# Patient Record
Sex: FEMALE | Race: OTHER | NOT HISPANIC OR LATINO | URBAN - METROPOLITAN AREA
[De-identification: names, ages, dates, MRNs, and addresses within clinical notes are randomized per-mention and may not be internally consistent; named-entity substitution may affect disease eponyms.]

---

## 2018-11-01 ENCOUNTER — EMERGENCY (EMERGENCY)
Facility: HOSPITAL | Age: 33
LOS: 1 days | Discharge: HOME | End: 2018-11-01
Attending: EMERGENCY MEDICINE
Payer: MEDICAID

## 2018-11-01 VITALS
OXYGEN SATURATION: 99 % | TEMPERATURE: 98 F | WEIGHT: 160.06 LBS | HEART RATE: 75 BPM | RESPIRATION RATE: 18 BRPM | DIASTOLIC BLOOD PRESSURE: 90 MMHG | SYSTOLIC BLOOD PRESSURE: 145 MMHG | HEIGHT: 65 IN

## 2018-11-01 VITALS
TEMPERATURE: 99 F | OXYGEN SATURATION: 100 % | HEART RATE: 68 BPM | RESPIRATION RATE: 16 BRPM | SYSTOLIC BLOOD PRESSURE: 136 MMHG | DIASTOLIC BLOOD PRESSURE: 106 MMHG

## 2018-11-01 PROCEDURE — 99283 EMERGENCY DEPT VISIT LOW MDM: CPT

## 2018-11-01 RX ORDER — GABAPENTIN 400 MG/1
1 CAPSULE ORAL
Qty: 14 | Refills: 0 | OUTPATIENT
Start: 2018-11-01 | End: 2018-11-14

## 2018-11-01 RX ORDER — OXYCODONE HYDROCHLORIDE 5 MG/1
5 TABLET ORAL ONCE
Qty: 0 | Refills: 0 | Status: DISCONTINUED | OUTPATIENT
Start: 2018-11-01 | End: 2018-11-01

## 2018-11-01 RX ORDER — GABAPENTIN 400 MG/1
300 CAPSULE ORAL ONCE
Qty: 0 | Refills: 0 | Status: COMPLETED | OUTPATIENT
Start: 2018-11-01 | End: 2018-11-01

## 2018-11-01 RX ADMIN — GABAPENTIN 300 MILLIGRAM(S): 400 CAPSULE ORAL at 16:50

## 2018-11-01 RX ADMIN — OXYCODONE HYDROCHLORIDE 5 MILLIGRAM(S): 5 TABLET ORAL at 16:50

## 2018-11-01 NOTE — ED PROVIDER NOTE - ENMT, MLM
Airway patent, Nasal mucosa clear. Mouth with normal mucosa. Normal dentition. Mild tenderness to left TMJ

## 2018-11-01 NOTE — ED ADULT NURSE NOTE - NSIMPLEMENTINTERV_GEN_ALL_ED
Implemented All Universal Safety Interventions:  [place] to call system. Call bell, personal items and telephone within reach. Instruct patient to call for assistance. Room bathroom lighting operational. Non-slip footwear when patient is off stretcher. Physically safe environment: no spills, clutter or unnecessary equipment. Stretcher in lowest position, wheels locked, appropriate side rails in place.

## 2018-11-01 NOTE — ED ADULT NURSE NOTE - OBJECTIVE STATEMENT
33 year old female presents to the ED complaining of intermittent left sided facial pain x 3 days, as per patient she's had intermittent left sided facial pain over the last year. Pt is A&O x 4, VSS, afebrile, ambulating independently. Pt denies fever, chills, NVD, SOB, or chest pain. On neurological assessment no neural or focal deficits notes. Pt speaking clearly, facial symmetry intact, ambulating with a steady gate in ED.

## 2018-11-01 NOTE — ED PROVIDER NOTE - MEDICAL DECISION MAKING DETAILS
33F presenting with intermittent sharp left sided facial pain x3 days. Clinical suspicion for trigeminal neuralgia. Benign physical exam with no obvious other source. 33F presenting with intermittent sharp left sided facial pain x3 days. Clinical suspicion for trigeminal neuralgia. Benign physical exam with no obvious other source.  MD Allison,Attending: pt seen. agree with above HPI/ROS/PE. sxs highly suggestive of TGN. similar episode months ago. No other HEENT or neuro sxs. Does have TMJ clicks bilateral but timing and severity of sxs make TMJ related pathology less likely to be cause. Has seen dentist and no dental cause found for sxs.  rx oxycodone now -- to  and then Rx for Gabapentin. asked to contact her PCP today for neurology followup.

## 2018-11-01 NOTE — ED PROVIDER NOTE - PROGRESS NOTE DETAILS
Patient to be started on gabapentin for likely trigeminal neuralgia. Patient to be discharged with instructions to see pcp for neurology followup and additional workup including possible MR

## 2018-11-01 NOTE — ED PROVIDER NOTE - OBJECTIVE STATEMENT
33F with no pmh presenting with left sided facial pain, intermittently described as a sharp shooting pain that travels to her forehead, cheeks and jaw x 3 days. States similar symptoms about 2-3 months ago, seen by dentist who stated no dental etiology. States symptoms resolved after 1-2 days. No other hx of similar symptoms. Endorses hx of jaw fracture which did not require surgery. States she feels like her teeth are more sensitive and some cold foods exacerbate symptoms. Has tried tylenol, motrin, codeine with no relief. No fever, chills, cp, sob, n/v/d, dizziness, headache, dysuria

## 2018-11-01 NOTE — ED ADULT NURSE NOTE - CHPI ED NUR SYMPTOMS NEG
no fever/no nausea/no numbness/no loss of consciousness/no vomiting/no change in level of consciousness/no dizziness/no confusion/no weakness/no blurred vision

## 2019-07-17 ENCOUNTER — RESULT REVIEW (OUTPATIENT)
Age: 34
End: 2019-07-17

## 2020-08-22 ENCOUNTER — EMERGENCY (EMERGENCY)
Facility: HOSPITAL | Age: 35
LOS: 1 days | Discharge: HOME | End: 2020-08-22
Attending: STUDENT IN AN ORGANIZED HEALTH CARE EDUCATION/TRAINING PROGRAM
Payer: MEDICAID

## 2020-08-22 VITALS
RESPIRATION RATE: 20 BRPM | TEMPERATURE: 98 F | DIASTOLIC BLOOD PRESSURE: 92 MMHG | WEIGHT: 164.91 LBS | SYSTOLIC BLOOD PRESSURE: 137 MMHG | HEIGHT: 65 IN | OXYGEN SATURATION: 95 % | HEART RATE: 80 BPM

## 2020-08-22 LAB
ALBUMIN SERPL ELPH-MCNC: 4.7 G/DL — SIGNIFICANT CHANGE UP (ref 3.3–5)
ALP SERPL-CCNC: 68 U/L — SIGNIFICANT CHANGE UP (ref 40–120)
ALT FLD-CCNC: 45 U/L — SIGNIFICANT CHANGE UP (ref 10–45)
ANION GAP SERPL CALC-SCNC: 12 MMOL/L — SIGNIFICANT CHANGE UP (ref 5–17)
APAP SERPL-MCNC: <15 UG/ML — SIGNIFICANT CHANGE UP (ref 10–30)
APPEARANCE UR: CLEAR — SIGNIFICANT CHANGE UP
AST SERPL-CCNC: 20 U/L — SIGNIFICANT CHANGE UP (ref 10–40)
BASOPHILS # BLD AUTO: 0.05 K/UL — SIGNIFICANT CHANGE UP (ref 0–0.2)
BASOPHILS NFR BLD AUTO: 0.6 % — SIGNIFICANT CHANGE UP (ref 0–2)
BILIRUB SERPL-MCNC: 0.4 MG/DL — SIGNIFICANT CHANGE UP (ref 0.2–1.2)
BILIRUB UR-MCNC: NEGATIVE — SIGNIFICANT CHANGE UP
BUN SERPL-MCNC: 9 MG/DL — SIGNIFICANT CHANGE UP (ref 7–23)
CALCIUM SERPL-MCNC: 8.9 MG/DL — SIGNIFICANT CHANGE UP (ref 8.4–10.5)
CHLORIDE SERPL-SCNC: 109 MMOL/L — HIGH (ref 96–108)
CO2 SERPL-SCNC: 25 MMOL/L — SIGNIFICANT CHANGE UP (ref 22–31)
COLOR SPEC: COLORLESS — SIGNIFICANT CHANGE UP
CREAT SERPL-MCNC: 0.8 MG/DL — SIGNIFICANT CHANGE UP (ref 0.5–1.3)
DIFF PNL FLD: NEGATIVE — SIGNIFICANT CHANGE UP
EOSINOPHIL # BLD AUTO: 0.04 K/UL — SIGNIFICANT CHANGE UP (ref 0–0.5)
EOSINOPHIL NFR BLD AUTO: 0.5 % — SIGNIFICANT CHANGE UP (ref 0–6)
ETHANOL SERPL-MCNC: 319 MG/DL — HIGH (ref 0–10)
GAS PNL BLDV: SIGNIFICANT CHANGE UP
GLUCOSE SERPL-MCNC: 94 MG/DL — SIGNIFICANT CHANGE UP (ref 70–99)
GLUCOSE UR QL: NEGATIVE — SIGNIFICANT CHANGE UP
HCG SERPL-ACNC: <2 MIU/ML — SIGNIFICANT CHANGE UP
HCT VFR BLD CALC: 45 % — SIGNIFICANT CHANGE UP (ref 34.5–45)
HGB BLD-MCNC: 14.9 G/DL — SIGNIFICANT CHANGE UP (ref 11.5–15.5)
IMM GRANULOCYTES NFR BLD AUTO: 0.5 % — SIGNIFICANT CHANGE UP (ref 0–1.5)
KETONES UR-MCNC: NEGATIVE — SIGNIFICANT CHANGE UP
LEUKOCYTE ESTERASE UR-ACNC: NEGATIVE — SIGNIFICANT CHANGE UP
LYMPHOCYTES # BLD AUTO: 1.95 K/UL — SIGNIFICANT CHANGE UP (ref 1–3.3)
LYMPHOCYTES # BLD AUTO: 25 % — SIGNIFICANT CHANGE UP (ref 13–44)
MAGNESIUM SERPL-MCNC: 2.5 MG/DL — SIGNIFICANT CHANGE UP (ref 1.6–2.6)
MCHC RBC-ENTMCNC: 30.6 PG — SIGNIFICANT CHANGE UP (ref 27–34)
MCHC RBC-ENTMCNC: 33.1 GM/DL — SIGNIFICANT CHANGE UP (ref 32–36)
MCV RBC AUTO: 92.4 FL — SIGNIFICANT CHANGE UP (ref 80–100)
MONOCYTES # BLD AUTO: 0.67 K/UL — SIGNIFICANT CHANGE UP (ref 0–0.9)
MONOCYTES NFR BLD AUTO: 8.6 % — SIGNIFICANT CHANGE UP (ref 2–14)
NEUTROPHILS # BLD AUTO: 5.05 K/UL — SIGNIFICANT CHANGE UP (ref 1.8–7.4)
NEUTROPHILS NFR BLD AUTO: 64.8 % — SIGNIFICANT CHANGE UP (ref 43–77)
NITRITE UR-MCNC: NEGATIVE — SIGNIFICANT CHANGE UP
NRBC # BLD: 0 /100 WBCS — SIGNIFICANT CHANGE UP (ref 0–0)
PCP SPEC-MCNC: SIGNIFICANT CHANGE UP
PH UR: 6.5 — SIGNIFICANT CHANGE UP (ref 5–8)
PHOSPHATE SERPL-MCNC: 4.1 MG/DL — SIGNIFICANT CHANGE UP (ref 2.5–4.5)
PLATELET # BLD AUTO: 292 K/UL — SIGNIFICANT CHANGE UP (ref 150–400)
POTASSIUM SERPL-MCNC: 3.4 MMOL/L — LOW (ref 3.5–5.3)
POTASSIUM SERPL-SCNC: 3.4 MMOL/L — LOW (ref 3.5–5.3)
PROT SERPL-MCNC: 7.1 G/DL — SIGNIFICANT CHANGE UP (ref 6–8.3)
PROT UR-MCNC: NEGATIVE — SIGNIFICANT CHANGE UP
RBC # BLD: 4.87 M/UL — SIGNIFICANT CHANGE UP (ref 3.8–5.2)
RBC # FLD: 12.5 % — SIGNIFICANT CHANGE UP (ref 10.3–14.5)
SALICYLATES SERPL-MCNC: <2 MG/DL — LOW (ref 15–30)
SARS-COV-2 RNA SPEC QL NAA+PROBE: SIGNIFICANT CHANGE UP
SODIUM SERPL-SCNC: 146 MMOL/L — HIGH (ref 135–145)
SP GR SPEC: 1 — LOW (ref 1.01–1.02)
UROBILINOGEN FLD QL: NEGATIVE — SIGNIFICANT CHANGE UP
WBC # BLD: 7.8 K/UL — SIGNIFICANT CHANGE UP (ref 3.8–10.5)
WBC # FLD AUTO: 7.8 K/UL — SIGNIFICANT CHANGE UP (ref 3.8–10.5)

## 2020-08-22 PROCEDURE — 99285 EMERGENCY DEPT VISIT HI MDM: CPT

## 2020-08-22 PROCEDURE — 93010 ELECTROCARDIOGRAM REPORT: CPT

## 2020-08-22 RX ORDER — SODIUM CHLORIDE 9 MG/ML
1000 INJECTION, SOLUTION INTRAVENOUS ONCE
Refills: 0 | Status: COMPLETED | OUTPATIENT
Start: 2020-08-22 | End: 2020-08-22

## 2020-08-22 RX ADMIN — SODIUM CHLORIDE 1000 MILLILITER(S): 9 INJECTION, SOLUTION INTRAVENOUS at 22:50

## 2020-08-22 NOTE — ED PROVIDER NOTE - PHYSICAL EXAMINATION
GENl: Patient awake alert NAD.   HEENT: normocephalic, atraumatic, EOMI, no scleral icterus.   CARDIAC: RRR, S1, S2, no murmur.   PULM: CTA B/L no wheeze, rhonchi, rales.   ABD: soft NT, ND, no rebound no guarding, no CVA tenderness.   MSK: no edema. 5/5 strength and full ROM in all extremities.     NEURO: A&Ox3, no focal neurological deficits, CN 2-12 grossly intact  SKIN: warm, dry, no rash.  Psych: + guarding.

## 2020-08-22 NOTE — ED ADULT NURSE NOTE - NSIMPLEMENTINTERV_GEN_ALL_ED
Implemented All Fall Risk Interventions:  [place] to call system. Call bell, personal items and telephone within reach. Instruct patient to call for assistance. Room bathroom lighting operational. Non-slip footwear when patient is off stretcher. Physically safe environment: no spills, clutter or unnecessary equipment. Stretcher in lowest position, wheels locked, appropriate side rails in place. Provide visual cue, wrist band, yellow gown, etc. Monitor gait and stability. Monitor for mental status changes and reorient to person, place, and time. Review medications for side effects contributing to fall risk. Reinforce activity limits and safety measures with patient and family.

## 2020-08-22 NOTE — ED PROVIDER NOTE - OBJECTIVE STATEMENT
34 yo F pm hx of depression, Rx Mirtazepine by pmd 1 week ago, brought in by  for SI attempt. Pt states she is depressed because she lost her job in Feb and has been trying to divorce her . In the ED, pt is well appearing AOx3 but guarding and     History below obtained form . 36 yo F pm hx of depression, Rx Mirtazepine by pmd 1 week ago, brought in by  for SI attempt. Pt states she is depressed because she lost her job in Feb and has been trying to divorce her . In the ED, pt is well appearing AOx3 but minimizing and guarding, states she took 1 tab of her antidepressant, denies ETOH, and that her  brought her to ed because he was worried she wasn't eating for 3 days. She states she would "rather poison herself" than go home. Lives at home with  and son, denies SI,HI,AVH. History below obtained form . States she drinks daily, and today texted him that she took "a lot" of antidepressants. he was concerned so brought her to ED.

## 2020-08-22 NOTE — ED ADULT NURSE NOTE - OBJECTIVE STATEMENT
35yF brought into ED from home by  for concern of possible overdose. PMH of depression and HTN. Pt endorses taking medication for HTN and depression per . Per , pt was drinking alcohol tonight and sent a text to her  that she took multiple pills tonight. When he arrived home, he states that he noticed she seemed drunk and pt told her  she took 5 pills tonight. Pt also states that she has had decreased appetite and has not eaten in the last three days. Pt also reports she has been having intermittent headaches but denies any pain at this time. Upon arrival to the ED, pt is AAOx4. Pt is playful and making jokes when asked questions. Pt well appearing and well groomed. Upon initial questioning pt denies SI/HI, alcohol use or drug use. However, during further conversation pt stated "I would rather poison myself than do that" when asked it she would to go home then pt laughed and states "I was kidding". Pt reports she has been feeling depressed over the past few months which has been getting worse. Pt also states that over the past few months she has been trying to divorce her  and three days ago when she stopped eating she reports that they had a conversation about the divorce which upset her. Pt denies any pain or discomfort at this time. Pt denies CP, SOB, N/V, abdominal pain, back pain, headaches, dizziness. Pt placed on constant observation for patient safety and staff member at bedside within arms reach. Safety maintained and bed locked in lowest position with appropriate side rails raised. ED MD at bedside to assess pt.

## 2020-08-22 NOTE — ED PROVIDER NOTE - NSFOLLOWUPCLINICS_GEN_ALL_ED_FT
[facility] Dept of Substance Abuse  Psychiatry Substance Abuse  [address] [place] [address]  Phone: [phone]  Fax:   Follow Up Time: Urgent    [facility]  Psychiatry  [address] [place] [address]  Phone: [phone]  Fax:   Follow Up Time: Urgent

## 2020-08-22 NOTE — ED PROVIDER NOTE - CLINICAL SUMMARY MEDICAL DECISION MAKING FREE TEXT BOX
36 yo F pm hx of depression, Rx Mirtazepine by pmd 1 week ago, brought in by  for SI attempt. Pt is minimizing situation. Psych clearance labs, U preg, serum mag phos, psych consult, and SW in the AM.

## 2020-08-22 NOTE — ED PROVIDER NOTE - PROGRESS NOTE DETAILS
signed out by Dr Garza, with reports of suicide attempt and intoxication with alcohol. also questionable safety issues as she reportedly said she rather poison herself than going back home. will reasses in AM for sobering , SW and psych then. DJ Madonna Jimenez M.D. Resident  telepsych will see patient. endorsed to me - dw psychiatry who clears pt - pt claims to not have si and does not recall the texts sent while intoxicated. no si now. roshan ramsey f/u -Andrew Brown MD-

## 2020-08-22 NOTE — ED PROVIDER NOTE - ATTENDING CONTRIBUTION TO CARE
Attending MD Garza:   I personally have seen and examined this patient.  ACP, Resident, medical student note reviewed and agree on plan of care and except where noted.    35y F PMH HTN, depression brought in by  with concern for possible overdose. 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 Patient told nurse she has wanted a divorce for a long time. Lives at home with  and minor son. Denies SI/HI, history of psychiatric illness, drug or alcohol use. Per  (Keeley Tran), patient has always "liked drinking alcohol," was often a source of fights, she has been drinking more since losing job, "anything she can find" including wine, champagne, vodka. He states she has ever had a drug use disorder that he knows of. Tonight patient texted him that she took a lot of pills. When he arrived home, she seemed drunk, said she only took 5 antidepressants, type unknown. Attending MD Garza:   I personally have seen and examined this patient.  ACP, Resident, medical student note reviewed and agree on plan of care and except where noted.    35y F PMH HTN, depression brought in by  with concern for possible overdose. 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 Patient told nurse she has wanted a divorce for a long time. Lives at home with  and minor son. Denies SI/HI, history of psychiatric illness, drug or alcohol use. Per  (Keeley Tran), patient has always "liked drinking alcohol," was often a source of fights, she has been drinking more since losing job, "anything she can find" including wine, champagne, vodka. He states she has ever had a drug use disorder that he knows of. Tonight patient texted him that she took a lot of pills. When he arrived home, she seemed drunk, said she only took 5 antidepressants, type unknown. PMD Eloy Beatty.    on exam patient is well appearing, vitals wnl, rrr s1s2, lungs clear, abdomen soft ntnd, fluid speech, A+O x 3, giddy, making jokes.     Concern for intentional ingestion/possible overdose, SI. Will obtain labs, ekg, place on 1:1, psych consult when sober, SW in AM.

## 2020-08-22 NOTE — ED PROVIDER NOTE - NSFOLLOWUPINSTRUCTIONS_ED_ALL_ED_FT
IMPORTANT INSTRUCTIONS FROM Dr. LARSON:    Please follow up with your personal medical doctor in 24-48 hours.   Bring results from today to your visit.    Follow up with psychiatry as discussed.     If you were advised to take any medications - be sure to review the package insert.    If your symptoms change, get worse or if you have any new symptoms, come to the ER right away.  If you have any questions, call the ER at the phone number on this page.

## 2020-08-22 NOTE — ED PROVIDER NOTE - NS ED ROS FT
GENERAL: No fever, chills  EYES: no vision changes, no discharge.   ENT: no difficulty swallowing or speaking   CARDIAC: no chest pain/pressure, SOB, lower extremity swelling  PULMONARY: no cough, SOB  GI: no abdominal pain, n/v/d  : no dysuria, no hematuria  SKIN: no rashes, no ecchymosis  NEURO: no headache, lightheadedness  MSK: No joint pain, myalgia, weakness.  Psych: + depressed.

## 2020-08-23 VITALS — SYSTOLIC BLOOD PRESSURE: 136 MMHG | HEART RATE: 80 BPM | DIASTOLIC BLOOD PRESSURE: 87 MMHG | TEMPERATURE: 98 F

## 2020-08-23 DIAGNOSIS — F10.10 ALCOHOL ABUSE, UNCOMPLICATED: ICD-10-CM

## 2020-08-23 DIAGNOSIS — F32.1 MAJOR DEPRESSIVE DISORDER, SINGLE EPISODE, MODERATE: ICD-10-CM

## 2020-08-23 LAB
SARS-COV-2 IGG SERPL QL IA: NEGATIVE — SIGNIFICANT CHANGE UP
SARS-COV-2 IGM SERPL IA-ACNC: 0.08 INDEX — SIGNIFICANT CHANGE UP
TSH SERPL-MCNC: 0.69 UIU/ML — SIGNIFICANT CHANGE UP (ref 0.27–4.2)

## 2020-08-23 PROCEDURE — 80307 DRUG TEST PRSMV CHEM ANLYZR: CPT

## 2020-08-23 PROCEDURE — 81003 URINALYSIS AUTO W/O SCOPE: CPT

## 2020-08-23 PROCEDURE — 82803 BLOOD GASES ANY COMBINATION: CPT

## 2020-08-23 PROCEDURE — 84100 ASSAY OF PHOSPHORUS: CPT

## 2020-08-23 PROCEDURE — 84295 ASSAY OF SERUM SODIUM: CPT

## 2020-08-23 PROCEDURE — 85014 HEMATOCRIT: CPT

## 2020-08-23 PROCEDURE — 86769 SARS-COV-2 COVID-19 ANTIBODY: CPT

## 2020-08-23 PROCEDURE — 84702 CHORIONIC GONADOTROPIN TEST: CPT

## 2020-08-23 PROCEDURE — 83735 ASSAY OF MAGNESIUM: CPT

## 2020-08-23 PROCEDURE — 84443 ASSAY THYROID STIM HORMONE: CPT

## 2020-08-23 PROCEDURE — 93005 ELECTROCARDIOGRAM TRACING: CPT

## 2020-08-23 PROCEDURE — 83605 ASSAY OF LACTIC ACID: CPT

## 2020-08-23 PROCEDURE — 84132 ASSAY OF SERUM POTASSIUM: CPT

## 2020-08-23 PROCEDURE — 80053 COMPREHEN METABOLIC PANEL: CPT

## 2020-08-23 PROCEDURE — 82330 ASSAY OF CALCIUM: CPT

## 2020-08-23 PROCEDURE — 82947 ASSAY GLUCOSE BLOOD QUANT: CPT

## 2020-08-23 PROCEDURE — 99285 EMERGENCY DEPT VISIT HI MDM: CPT

## 2020-08-23 PROCEDURE — 82435 ASSAY OF BLOOD CHLORIDE: CPT

## 2020-08-23 PROCEDURE — 85027 COMPLETE CBC AUTOMATED: CPT

## 2020-08-23 RX ORDER — ACETAMINOPHEN 500 MG
975 TABLET ORAL ONCE
Refills: 0 | Status: COMPLETED | OUTPATIENT
Start: 2020-08-23 | End: 2020-08-23

## 2020-08-23 RX ADMIN — Medication 975 MILLIGRAM(S): at 09:55

## 2020-08-23 NOTE — ED BEHAVIORAL HEALTH ASSESSMENT NOTE - SUICIDE PROTECTIVE FACTORS
Supportive social network of family or friends/Fear of death or the actual act of killing self/Engaged in work or school/Identifies reasons for living/Has future plans/Responsibility to family and others

## 2020-08-23 NOTE — ED BEHAVIORAL HEALTH ASSESSMENT NOTE - SUMMARY
The patient is a 35yr old  woman with a PMH HTN and depression brought in by her  last night after she sent him a text that "she had taken a lot on antidepressants," with concern for possible overdose.  She admits to drinking a bottle of wine after a two day fast (for weight loss) and says she blacked out and does not clearly recall what she did but thinks she may have taken five tablets of Remeron 15mg which she was prescribed for insomnia and depression. She denies having any history of suicidality in the past.  Pt's  reported that when he arrived home, after she sent him the text message, pt seemed intoxicated and said she only took 5 tablets of Remeron 15mg.   Pt now appears calm and able to speak clearly with no thoughts of harming herself or others.  She denies ever having thoughts of suicide and says she does not recall what she did when she was intoxicated yesterday evening, though she agrees that this was serious and says she would like outpatient therapy.    She says that this is the first time she had a "black out" after drinking and says she generally drinks only two glasses or half a bottle of wine on some evenings, not every day.  She denies ever having any alcohol withdrawal symptoms.  She admits she has been reluctant to seek treatment for her depression and alcohol use, because she thought she should be able to handle this herself.  She understands that she would benefit from outpatient therapy and AA groups and agrees to this. Her  is aware of the discharge plans since he arrived and is at her bedside. The patient is a 35yr old  woman with a PMH HTN and depression brought in by her  last night after she sent him a text that "she had taken a lot of antidepressants," with concern for possible overdose.  She admits to drinking a bottle of wine after a two day fast (for weight loss) and says she blacked out and does not clearly recall what she did but thinks she may have taken five tablets of Remeron 15mg which she was prescribed for insomnia and depression. She denies having any history of suicidality in the past.  Pt's  reported that when he arrived home, after she sent him the text message, pt seemed intoxicated and said she only took 5 tablets of Remeron 15mg.   Pt now appears calm and able to speak clearly with no thoughts of harming herself or others.  She denies ever having thoughts of suicide and says she does not recall what she did when she was intoxicated yesterday evening, though she agrees that this was serious and says she would like outpatient therapy.    She says that this is the first time she had a "black out" after drinking and says she generally drinks only two glasses or half a bottle of wine on some evenings, not every day.  She denies ever having any alcohol withdrawal symptoms.  She admits she has been reluctant to seek treatment for her depression and alcohol use, because she thought she should be able to handle this herself.  She understands that she would benefit from outpatient therapy and AA groups and agrees to this. Her  is aware of the discharge plans since he arrived and is at her bedside.

## 2020-08-23 NOTE — ED BEHAVIORAL HEALTH ASSESSMENT NOTE - REFERRAL / APPOINTMENT DETAILS
referral for the [facility] clinic at [facility] [phone] and the walk in clinic [phone]

## 2020-08-23 NOTE — ED BEHAVIORAL HEALTH ASSESSMENT NOTE - HPI (INCLUDE ILLNESS QUALITY, SEVERITY, DURATION, TIMING, CONTEXT, MODIFYING FACTORS, ASSOCIATED SIGNS AND SYMPTOMS)
The patient is a 35yr old  woman with a PMH HTN and depression brought in by her  last night after she sent him a text that "she had taken a lot on antidepressants," with concern for possible overdose. Patient reports that they are in the process of  and she just signed a lease for an apartment of [facility] where she hopes to move with her 6 yr old son next weekend. She says that yesterday she spent the day with her son at the pool in [place] and her  had taken their son to his sister's house in the evening for dinner.  She admits to drinking a bottle of wine after a two day fast (for weight loss) and says she blacked out and does not clearly recall what she did but thinks she may have taken five tablets of Remeron 15mg which she was prescribed for insomnia and depression. She denies having any history of suicidality in the past.  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 Patient has wanted a divorce for a few years and is now in the process of separation. Pt's  (Keeley Tran) reported that patient has always "liked drinking alcohol," and this was often a source of fights. She has been drinking more since losing job, "anything she can find" including wine, champagne, vodka. After patient sent his a text that she took a lot of pills. When he arrived home, she seemed intoxicated and said she only took 5 tablets of Remeron 15mg.   Pt now appears calm and able to speak clearly with no thoughts of harming herself or others.  She denies ever having thoughts of suicide and says she does not recall what she did when she was intoxicated yesterday evening, though she agrees that this was serious and says she would like outpt therapy.    She says that this is the first time she had a "black out" after drinking and says she generally drinks only two glasses or half a bottle of wine.  She denies ever having any alcohol withdrawal symptoms.  She admits she has been reluctant to seek treatment for her depression and alcohol use, because she thought she should be able to handle this herself.  She understands that she would benefit from outpatient therapy and AA groups and agrees this. The patient is a 35yr old  woman with a PMH HTN and depression brought in by her  last night after she sent him a text that "she had taken a lot on antidepressants," with concern for possible overdose. Patient reports that they are in the process of  and she just signed a lease for an apartment on [facility] where she hopes to move with her 6 yr old son next weekend. She says that yesterday she spent the day with her son at the pool in [place] and her  had taken their son to his sister's house in the evening for dinner.  She admits to drinking a bottle of wine after a two day fast (for weight loss) and says she blacked out and does not clearly recall what she did but thinks she may have taken five tablets of Remeron 15mg which she was prescribed for insomnia and depression. She denies having any history of suicidality in the past.  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 Patient has wanted a divorce for a few years and is now in the process of separation. Pt's  (Keeley Tran) reported that patient has always "liked drinking alcohol," and this was often a source of fights. She has been drinking more since losing her job, "anything she can find" including wine, champagne, vodka. Yesterday evening patient sent him a text that she took "a lot of pills". When he arrived home, she seemed intoxicated and said she only took 5 tablets of Remeron 15mg.   Pt now appears calm and able to speak clearly with no thoughts of harming herself or others.  She denies ever having thoughts of suicide and says she does not recall what she did when she was intoxicated yesterday evening, though she agrees that this was serious and says she would like outpatient therapy.    She says that this is the first time she had a "black out" after drinking and says she generally drinks only two glasses or half a bottle of wine on some evening, not every day.  She denies ever having any alcohol withdrawal symptoms.  She admits she has been reluctant to seek treatment for her depression and alcohol use, because she thought she should be able to handle this herself.  She understands that she would benefit from outpatient therapy and AA groups and agrees to this. Her  is aware of the discharge plans since he arrived and is at her bedside. The patient is a 35yr old  woman with a PMH HTN and depression brought in by her  last night after she sent him a text that "she had taken a lot on antidepressants," with concern for possible overdose. Patient reports that they are in the process of  and she just signed a lease for an apartment on [facility] where she hopes to move with her 6 yr old son next weekend. She says that yesterday she spent the day with her son at the pool in [place] and her  had taken their son to his sister's house in the evening for dinner.  She admits to drinking a bottle of wine after a two day fast (for weight loss) and says she blacked out and does not clearly recall what she did but thinks she may have taken five tablets of Remeron 15mg which she was prescribed for insomnia and depression. She denies having any history of suicidality in the past.  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 Patient has wanted a divorce for a few years and is now in the process of separation. Pt's  (Keeley Tran) reported that patient has always "liked drinking alcohol," and this was often a source of fights. She has been drinking more since losing her job, "anything she can find" including wine, champagne, vodka. Yesterday evening patient sent him a text that she took "a lot of pills". When he arrived home, she seemed intoxicated and said she only took 5 tablets of Remeron 15mg.   Pt now appears calm and able to speak clearly with no thoughts of harming herself or others.  She denies ever having thoughts of suicide and says she does not recall what she did when she was intoxicated yesterday evening, though she agrees that this was serious and says she would like outpatient therapy.    She says that this is the first time she had a "black out" after drinking and says she generally drinks only two glasses or half a bottle of wine on some evenings, not every day.  She denies ever having any alcohol withdrawal symptoms.  She admits she has been reluctant to seek treatment for her depression and alcohol use, because she thought she should be able to handle this herself.  She understands that she would benefit from outpatient therapy and AA groups and agrees to this. Her  is aware of the discharge plans since he arrived. The patient is a 35yr old  woman with a PMH HTN and depression brought in by her  last night after she sent him a text that "she had taken a lot of antidepressants," with concern for possible overdose. Patient reports that they are in the process of  and she just signed a lease for an apartment on [facility] where she hopes to move with her 6 yr old son next weekend. She says that yesterday she spent the day with her son at the pool in [place] and her  had taken their son to his sister's house in the evening for dinner.  She admits to drinking a bottle of wine after a two day fast (for weight loss) and says she blacked out and does not clearly recall what she did but thinks she may have taken five tablets of Remeron 15mg which she was prescribed for insomnia and depression. She denies having any history of suicidality in the past.  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 Patient has wanted a divorce for a few years and is now in the process of separation. Pt's  (Keeley Tran) reported that patient has always "liked drinking alcohol," and this was often a source of fights. She has been drinking more since losing her job, "anything she can find" including wine, champagne, vodka. Yesterday evening patient sent him a text that she took "a lot of pills". When he arrived home, she seemed intoxicated and said she only took 5 tablets of Remeron 15mg.   Pt now appears calm and able to speak clearly with no thoughts of harming herself or others.  She denies ever having thoughts of suicide and says she does not recall what she did when she was intoxicated yesterday evening, though she agrees that this was serious and says she would like outpatient therapy.    She says that this is the first time she had a "black out" after drinking and says she generally drinks only two glasses or half a bottle of wine on some evenings, not every day.  She denies ever having any alcohol withdrawal symptoms.  She admits she has been reluctant to seek treatment for her depression and alcohol use, because she thought she should be able to handle this herself.  She understands that she would benefit from outpatient therapy and AA groups and agrees to this. Her  is aware of the discharge plans since he arrived.

## 2020-08-23 NOTE — ED BEHAVIORAL HEALTH ASSESSMENT NOTE - SAFETY PLAN DETAILS
Pt agrees to call the clinic or return to the ED if experiencing any SI or alcohol withdrawal symptoms.

## 2020-08-23 NOTE — ED BEHAVIORAL HEALTH ASSESSMENT NOTE - DESCRIPTION
Pt has been calm and cooperative.  She received Librium 50mg earlier today which she says made her a little drowsy, but she is alert now with no symptoms of alcohol withdrawal and no thoughts of harming herself or others. 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 She is originally from [place] and fluent in English.

## 2020-08-23 NOTE — ED BEHAVIORAL HEALTH ASSESSMENT NOTE - RISK ASSESSMENT
Pt is not an acute risk for self harm though she remains at chronic risk because of her alcohol abuse history. Low Acute Suicide Risk

## 2020-08-23 NOTE — ED ADULT NURSE REASSESSMENT NOTE - NS ED NURSE REASSESS COMMENT FT1
Assumed pt care from previous RN.  Bedside report with Zachary DAUGHERTY RN.  Pt alert and oriented, stretcher side rails up, and in lowest locked position.  Safety maintained. Pt updated on POC. PT remains on 1:1.  Pt understands POC and need for 1:1.  Pt is calm and compliant.  Awaiting psych eval.

## 2020-08-23 NOTE — ED BEHAVIORAL HEALTH NOTE - BEHAVIORAL HEALTH NOTE
Consult requested by EM Resident Dr. Jimenez at 6:44. Telepsychiatry attempted to consult at 6:49 but unable to initiate due to patient not being set up. ED staff aware.

## 2020-12-24 PROBLEM — F41.9 ANXIETY DISORDER, UNSPECIFIED: Chronic | Status: ACTIVE | Noted: 2020-08-23

## 2020-12-24 PROBLEM — F10.10 ALCOHOL ABUSE, UNCOMPLICATED: Chronic | Status: ACTIVE | Noted: 2020-08-23

## 2020-12-28 ENCOUNTER — APPOINTMENT (OUTPATIENT)
Dept: ULTRASOUND IMAGING | Facility: CLINIC | Age: 35
End: 2020-12-28
Payer: MEDICAID

## 2020-12-28 ENCOUNTER — OUTPATIENT (OUTPATIENT)
Dept: OUTPATIENT SERVICES | Facility: HOSPITAL | Age: 35
LOS: 1 days | End: 2020-12-28
Payer: MEDICAID

## 2020-12-28 DIAGNOSIS — R10.2 PELVIC AND PERINEAL PAIN: ICD-10-CM

## 2020-12-28 PROCEDURE — 76830 TRANSVAGINAL US NON-OB: CPT | Mod: 26

## 2020-12-28 PROCEDURE — 76830 TRANSVAGINAL US NON-OB: CPT

## 2020-12-28 PROCEDURE — 76856 US EXAM PELVIC COMPLETE: CPT

## 2020-12-28 PROCEDURE — 76856 US EXAM PELVIC COMPLETE: CPT | Mod: 26,59

## 2021-02-19 ENCOUNTER — APPOINTMENT (OUTPATIENT)
Dept: CT IMAGING | Facility: CLINIC | Age: 36
End: 2021-02-19
Payer: MEDICAID

## 2021-02-19 ENCOUNTER — OUTPATIENT (OUTPATIENT)
Dept: OUTPATIENT SERVICES | Facility: HOSPITAL | Age: 36
LOS: 1 days | End: 2021-02-19
Payer: MEDICAID

## 2021-02-19 DIAGNOSIS — J34.2 DEVIATED NASAL SEPTUM: ICD-10-CM

## 2021-02-19 PROCEDURE — 70450 CT HEAD/BRAIN W/O DYE: CPT

## 2021-02-19 PROCEDURE — 70450 CT HEAD/BRAIN W/O DYE: CPT | Mod: 26

## 2021-03-24 NOTE — ED ADULT NURSE NOTE - PLAN OF CARE
Patient is scheduled to have her teeth pulled in about 2 weeks. She was asked if she has a heart mummer and she stated she did when she was 7 years old. Patient is asking if that is something in her chart that is noted?   Patient stated they are requesting because she may need an antibiotic prescription before dental procedure.     Please Advise    Explanation of exam/test/Position of comfort/Call bell

## 2021-07-30 ENCOUNTER — TRANSCRIPTION ENCOUNTER (OUTPATIENT)
Age: 36
End: 2021-07-30

## 2021-09-05 ENCOUNTER — EMERGENCY (EMERGENCY)
Facility: HOSPITAL | Age: 36
LOS: 1 days | Discharge: HOME | End: 2021-09-05
Attending: STUDENT IN AN ORGANIZED HEALTH CARE EDUCATION/TRAINING PROGRAM | Admitting: STUDENT IN AN ORGANIZED HEALTH CARE EDUCATION/TRAINING PROGRAM
Payer: MEDICAID

## 2021-09-05 VITALS
DIASTOLIC BLOOD PRESSURE: 77 MMHG | TEMPERATURE: 98 F | HEART RATE: 94 BPM | OXYGEN SATURATION: 100 % | SYSTOLIC BLOOD PRESSURE: 121 MMHG | RESPIRATION RATE: 18 BRPM

## 2021-09-05 LAB
ALBUMIN SERPL ELPH-MCNC: 4.7 G/DL — SIGNIFICANT CHANGE UP (ref 3.3–5)
ALP SERPL-CCNC: 73 U/L — SIGNIFICANT CHANGE UP (ref 40–120)
ALT FLD-CCNC: 12 U/L — SIGNIFICANT CHANGE UP (ref 4–33)
AMPHET UR-MCNC: NEGATIVE — SIGNIFICANT CHANGE UP
ANION GAP SERPL CALC-SCNC: 15 MMOL/L — HIGH (ref 7–14)
APAP SERPL-MCNC: <15 UG/ML — SIGNIFICANT CHANGE UP (ref 15–25)
AST SERPL-CCNC: 8 U/L — SIGNIFICANT CHANGE UP (ref 4–32)
BARBITURATES UR SCN-MCNC: NEGATIVE — SIGNIFICANT CHANGE UP
BASOPHILS # BLD AUTO: 0.05 K/UL — SIGNIFICANT CHANGE UP (ref 0–0.2)
BASOPHILS NFR BLD AUTO: 0.6 % — SIGNIFICANT CHANGE UP (ref 0–2)
BENZODIAZ UR-MCNC: NEGATIVE — SIGNIFICANT CHANGE UP
BILIRUB SERPL-MCNC: <0.2 MG/DL — SIGNIFICANT CHANGE UP (ref 0.2–1.2)
BUN SERPL-MCNC: 13 MG/DL — SIGNIFICANT CHANGE UP (ref 7–23)
CALCIUM SERPL-MCNC: 8.4 MG/DL — SIGNIFICANT CHANGE UP (ref 8.4–10.5)
CHLORIDE SERPL-SCNC: 111 MMOL/L — HIGH (ref 98–107)
CO2 SERPL-SCNC: 22 MMOL/L — SIGNIFICANT CHANGE UP (ref 22–31)
COCAINE METAB.OTHER UR-MCNC: NEGATIVE — SIGNIFICANT CHANGE UP
CREAT SERPL-MCNC: 0.69 MG/DL — SIGNIFICANT CHANGE UP (ref 0.5–1.3)
CREATININE URINE RESULT, DAU: 67 MG/DL — SIGNIFICANT CHANGE UP
EOSINOPHIL # BLD AUTO: 0.1 K/UL — SIGNIFICANT CHANGE UP (ref 0–0.5)
EOSINOPHIL NFR BLD AUTO: 1.2 % — SIGNIFICANT CHANGE UP (ref 0–6)
ETHANOL SERPL-MCNC: 308 MG/DL — HIGH
GLUCOSE SERPL-MCNC: 78 MG/DL — SIGNIFICANT CHANGE UP (ref 70–99)
HCT VFR BLD CALC: 44.6 % — SIGNIFICANT CHANGE UP (ref 34.5–45)
HGB BLD-MCNC: 14.6 G/DL — SIGNIFICANT CHANGE UP (ref 11.5–15.5)
IANC: 4.97 K/UL — SIGNIFICANT CHANGE UP (ref 1.5–8.5)
IMM GRANULOCYTES NFR BLD AUTO: 0.7 % — SIGNIFICANT CHANGE UP (ref 0–1.5)
LYMPHOCYTES # BLD AUTO: 2.49 K/UL — SIGNIFICANT CHANGE UP (ref 1–3.3)
LYMPHOCYTES # BLD AUTO: 30.3 % — SIGNIFICANT CHANGE UP (ref 13–44)
MCHC RBC-ENTMCNC: 30.4 PG — SIGNIFICANT CHANGE UP (ref 27–34)
MCHC RBC-ENTMCNC: 32.7 GM/DL — SIGNIFICANT CHANGE UP (ref 32–36)
MCV RBC AUTO: 92.9 FL — SIGNIFICANT CHANGE UP (ref 80–100)
METHADONE UR-MCNC: NEGATIVE — SIGNIFICANT CHANGE UP
MONOCYTES # BLD AUTO: 0.54 K/UL — SIGNIFICANT CHANGE UP (ref 0–0.9)
MONOCYTES NFR BLD AUTO: 6.6 % — SIGNIFICANT CHANGE UP (ref 2–14)
NEUTROPHILS # BLD AUTO: 4.97 K/UL — SIGNIFICANT CHANGE UP (ref 1.8–7.4)
NEUTROPHILS NFR BLD AUTO: 60.6 % — SIGNIFICANT CHANGE UP (ref 43–77)
NRBC # BLD: 0 /100 WBCS — SIGNIFICANT CHANGE UP
NRBC # FLD: 0 K/UL — SIGNIFICANT CHANGE UP
OPIATES UR-MCNC: NEGATIVE — SIGNIFICANT CHANGE UP
OXYCODONE UR-MCNC: NEGATIVE — SIGNIFICANT CHANGE UP
PCP SPEC-MCNC: SIGNIFICANT CHANGE UP
PCP UR-MCNC: NEGATIVE — SIGNIFICANT CHANGE UP
PLATELET # BLD AUTO: 355 K/UL — SIGNIFICANT CHANGE UP (ref 150–400)
POTASSIUM SERPL-MCNC: 3.9 MMOL/L — SIGNIFICANT CHANGE UP (ref 3.5–5.3)
POTASSIUM SERPL-SCNC: 3.9 MMOL/L — SIGNIFICANT CHANGE UP (ref 3.5–5.3)
PROT SERPL-MCNC: 7.3 G/DL — SIGNIFICANT CHANGE UP (ref 6–8.3)
RBC # BLD: 4.8 M/UL — SIGNIFICANT CHANGE UP (ref 3.8–5.2)
RBC # FLD: 12.3 % — SIGNIFICANT CHANGE UP (ref 10.3–14.5)
SALICYLATES SERPL-MCNC: <5 MG/DL — LOW (ref 15–30)
SODIUM SERPL-SCNC: 148 MMOL/L — HIGH (ref 135–145)
THC UR QL: NEGATIVE — SIGNIFICANT CHANGE UP
TOXICOLOGY SCREEN, DRUGS OF ABUSE, SERUM RESULT: SIGNIFICANT CHANGE UP
TSH SERPL-MCNC: 0.4 UIU/ML — SIGNIFICANT CHANGE UP (ref 0.27–4.2)
WBC # BLD: 8.21 K/UL — SIGNIFICANT CHANGE UP (ref 3.8–10.5)
WBC # FLD AUTO: 8.21 K/UL — SIGNIFICANT CHANGE UP (ref 3.8–10.5)

## 2021-09-05 PROCEDURE — 99284 EMERGENCY DEPT VISIT MOD MDM: CPT | Mod: 25

## 2021-09-05 PROCEDURE — 93010 ELECTROCARDIOGRAM REPORT: CPT

## 2021-09-05 RX ORDER — IBUPROFEN 200 MG
600 TABLET ORAL ONCE
Refills: 0 | Status: COMPLETED | OUTPATIENT
Start: 2021-09-05 | End: 2021-09-05

## 2021-09-05 RX ORDER — SODIUM CHLORIDE 9 MG/ML
1000 INJECTION INTRAMUSCULAR; INTRAVENOUS; SUBCUTANEOUS ONCE
Refills: 0 | Status: COMPLETED | OUTPATIENT
Start: 2021-09-05 | End: 2021-09-05

## 2021-09-05 RX ADMIN — SODIUM CHLORIDE 1000 MILLILITER(S): 9 INJECTION INTRAMUSCULAR; INTRAVENOUS; SUBCUTANEOUS at 18:21

## 2021-09-05 RX ADMIN — Medication 600 MILLIGRAM(S): at 19:43

## 2021-09-05 NOTE — ED PROVIDER NOTE - PROGRESS NOTE DETAILS
JCARLOS Hill - Spoke with  Romeo Brown [phone] - states patient drinks every day and that had multiple episodes of intoxication with suidical ideation and had been to many ERs and been seen by psych and always dc. Concerned about repetitive behavior. Olaf Marks, PGY-2- Patient reevaluated now that clinically sober. Discussed today's events. States she felt sad as she is going through a lot and was drinking alcohol. States she does not usually do this and was having a bad day. Feels better now, does not want to kill herself. Denies SI/HI at baseline. Patient to go home with her boyfriend. Has a psychiatrist she sees monthly and sees a therapist every week. Has appt in few days with therapist. Patient is aware of reasons to return to ED and should she have SI/HI she will return. Discussed admission with patient if she feels that she is a threat to herself at home, which she denies. Patient stable for discharge. Discussed results of work up with patient. Patient agrees with plan to discharge home. All questions answered regarding plan. Strict return precautions given.

## 2021-09-05 NOTE — ED PROVIDER NOTE - CLINICAL SUMMARY MEDICAL DECISION MAKING FREE TEXT BOX
36F presents with alcohol intoxication and SI, brought in by friend after expressing SI.  Pt reports drinking most days, attempting to taper down her Lexapro on her own (from 20mg down to 5mg now, not recommended by her doctor).  Prior SA but cutting herself at age 21.  Denies HI , AVH.  Pt expressed wanting to leave ER, but is redirected and appears cooperative at this time.  no neuro deficits, heart mildly tachycardic, abd soft ntnd, lungs cta, normal gait.  Plan for labs, ekg, IV fluids, likkwame  consult once sober.  - Juani Merritt, DO

## 2021-09-05 NOTE — ED ADULT NURSE NOTE - OBJECTIVE STATEMENT
Pt A/Ox4, requesting to be discharged home, MD Merritt informed. Pt states she drank a lot of wine today and has been having suicidal thoughts, Denies HI, denies other drug use. Denies AH, TH, VH. Calm at present. Denies having a plan to hurt herself, but has been self adjusting her Lexapro. Pt tearful upon assessment, Per JCARLOS Hill, Pt okay to keep her cell phone, Clothing placed outside room 21. PIV inserted with aseptic technique, blood drawn, labeled at bedside with 2 pt identifiers and sent to lab. Pt aware of POC, NAD. Will continue to monitor. IVF started per order, EKG in progress.

## 2021-09-05 NOTE — ED PROVIDER NOTE - PATIENT PORTAL LINK FT
You can access the FollowMyHealth Patient Portal offered by [facility] by registering at the following website: [email or web address]. By joining [facility]’s FollowMyHealth portal, you will also be able to view your health information using other applications (apps) compatible with our system.

## 2021-09-05 NOTE — ED ADULT TRIAGE NOTE - CHIEF COMPLAINT QUOTE
ems called by friends. reported to friends " I want to hurt myself and goodbye" pt arrives oriented x 2. reports drank 1/2 bottle wine,took normal psych meds. pt with slurred speech. difficulty finishing sentences. denies suicidal thoughts. states took 2 lexapro instead of one. reports total 10 mg.. fs 88  crying at triage

## 2021-09-05 NOTE — ED PROVIDER NOTE - OBJECTIVE STATEMENT
36F presents with alcohol intoxication and SI, brought in by friend after expressing SI.  Pt reports drinking most days, attempting to taper down her Lexapro on her own (from 20mg down to 5mg now, not recommended by her doctor).  Prior SA but cutting herself at age 21.  Denies HI , AVH.  Pt expressed wanting to leave ER, but is redirected and appears cooperative at this time.

## 2021-09-05 NOTE — ED ADULT NURSE REASSESSMENT NOTE - NS ED NURSE REASSESS COMMENT FT1
report received from day shift rn. Pt sleeping in stretcher, arousable to verbal stimuli. pt remains on 1:1 for safety. PCA at bedside within arms reach. Vitals as noted. Respirations even and unlabored, no accessory muscle use.

## 2021-09-05 NOTE — ED PROVIDER NOTE - PHYSICAL EXAMINATION
Gen: NAD, AOx3, mildly intoxicated appearing, but ambulating without difficulty  Head: NCAT  HEENT: PERRL, oral mucosa moist, normal conjunctiva  Lung: CTAB, no respiratory distress  CV: rrr, no murmurs, Normal perfusion  Abd: soft, NTND  MSK: No edema, no visible deformities  Neuro: No focal neurologic deficits, normal gait  Skin: No rash   Psych: depressed, avoids eye contact at certain topics, otherwise conversing appropriately.

## 2021-09-05 NOTE — ED ADULT TRIAGE NOTE - ARRIVAL FROM
Home Complex Repair And Xenograft Text: The defect edges were debeveled with a #15 scalpel blade.  The primary defect was closed partially with a complex linear closure.  Given the location of the defect, shape of the defect and the proximity to free margins a xenograft was deemed most appropriate to repair the remaining defect.  The graft was trimmed to fit the size of the remaining defect.  The graft was then placed in the primary defect, oriented appropriately, and sutured into place.

## 2021-09-05 NOTE — ED PROVIDER NOTE - PROGRESS NOTE
Potassium was slightly low on recent labs. Will take multivitamin and eat bananas.  
Swelling under chin for about 2 months. Maybe growing a little. Not really painful. No recent infections in ears, nose or throat, skin.  
TECHNIQUE/ EXAM DESCRIPTION AND NUMBER OF VIEWS: 5 views of the lumbar spine.    COMPARISON: None.    FINDINGS:  Vertebral body height is well maintained. There is no evidence of fracture. There is convex right scoliotic curvature. No abnormal motion with flexion and extension. There is mild decreased disc height and endplate spurring at multiple levels. There is   multilevel facet degeneration.   Impression       1.  Multilevel degenerative disc disease and facet degeneration.    2.  No abnormal motion with flexion and extension.   Reading Provider Reading Date   Abimael aRines M.D. Sep 13, 2017     Still having back pain. Still getting leg cramping, heaviness. No numbness or tingling. Works in manufacturing and lifts heavy objects which she feels makes the problem worse. Not taking any meds.  
Stable.

## 2021-09-05 NOTE — ED PROVIDER NOTE - NSFOLLOWUPINSTRUCTIONS_ED_ALL_ED_FT
1) Follow up with your therapist and psychiatrist at your scheduled appointments. Return to ED IMMEDIATELY if you feel that you are going to harm/kill yourself or anybody else  2) Return to the ED immediately for new or worsening symptoms thoughts of harming yourself or anybody else, persistent sadness, hopelessness, thoughts of harming/killing yourself or anybody else  3) Please continue to take any home medications as prescribed  4) Your test results from your ED visit were discussed with you prior to discharge  5) You were provided with a copy of your test results  6) Please limit alcohol intake

## 2021-09-06 VITALS
SYSTOLIC BLOOD PRESSURE: 122 MMHG | TEMPERATURE: 98 F | OXYGEN SATURATION: 100 % | DIASTOLIC BLOOD PRESSURE: 74 MMHG | RESPIRATION RATE: 16 BRPM | HEART RATE: 80 BPM

## 2021-09-06 NOTE — ED ADULT NURSE REASSESSMENT NOTE - NS ED NURSE REASSESS COMMENT FT1
Pt A&Ox4, resting in stretcher. Pt denies any SI/HI, audiotry or visual hallucinations at this time. Pt denies cp, sob, abd pain, ha, dizziness, n/v/d, fevers/chills. Respirations even and unlabored, no accessory muscle use. Pt remains on 1:1

## 2021-12-28 ENCOUNTER — TRANSCRIPTION ENCOUNTER (OUTPATIENT)
Age: 36
End: 2021-12-28

## 2023-08-23 ENCOUNTER — NON-APPOINTMENT (OUTPATIENT)
Age: 38
End: 2023-08-23

## 2024-02-18 ENCOUNTER — EMERGENCY (EMERGENCY)
Facility: HOSPITAL | Age: 39
LOS: 1 days | Discharge: HOME | End: 2024-02-18
Attending: EMERGENCY MEDICINE
Payer: COMMERCIAL

## 2024-02-18 VITALS
RESPIRATION RATE: 15 BRPM | HEART RATE: 119 BPM | TEMPERATURE: 99 F | SYSTOLIC BLOOD PRESSURE: 122 MMHG | OXYGEN SATURATION: 98 % | DIASTOLIC BLOOD PRESSURE: 74 MMHG | WEIGHT: 179.9 LBS | HEIGHT: 65 IN

## 2024-02-18 VITALS
RESPIRATION RATE: 18 BRPM | DIASTOLIC BLOOD PRESSURE: 70 MMHG | TEMPERATURE: 99 F | OXYGEN SATURATION: 96 % | HEART RATE: 78 BPM | SYSTOLIC BLOOD PRESSURE: 122 MMHG

## 2024-02-18 LAB
ALBUMIN SERPL ELPH-MCNC: 4.3 G/DL — SIGNIFICANT CHANGE UP (ref 3.3–5)
ALP SERPL-CCNC: 86 U/L — SIGNIFICANT CHANGE UP (ref 40–120)
ALT FLD-CCNC: 14 U/L — SIGNIFICANT CHANGE UP (ref 10–45)
ANION GAP SERPL CALC-SCNC: 14 MMOL/L — SIGNIFICANT CHANGE UP (ref 5–17)
APPEARANCE UR: ABNORMAL
AST SERPL-CCNC: 7 U/L — LOW (ref 10–40)
BACTERIA # UR AUTO: NEGATIVE /HPF — SIGNIFICANT CHANGE UP
BASE EXCESS BLDV CALC-SCNC: -0.5 MMOL/L — SIGNIFICANT CHANGE UP (ref -2–3)
BASOPHILS # BLD AUTO: 0.03 K/UL — SIGNIFICANT CHANGE UP (ref 0–0.2)
BASOPHILS NFR BLD AUTO: 0.3 % — SIGNIFICANT CHANGE UP (ref 0–2)
BILIRUB SERPL-MCNC: 0.3 MG/DL — SIGNIFICANT CHANGE UP (ref 0.2–1.2)
BILIRUB UR-MCNC: NEGATIVE — SIGNIFICANT CHANGE UP
BUN SERPL-MCNC: 18 MG/DL — SIGNIFICANT CHANGE UP (ref 7–23)
CA-I SERPL-SCNC: 1.13 MMOL/L — LOW (ref 1.15–1.33)
CALCIUM SERPL-MCNC: 8.8 MG/DL — SIGNIFICANT CHANGE UP (ref 8.4–10.5)
CHLORIDE BLDV-SCNC: 105 MMOL/L — SIGNIFICANT CHANGE UP (ref 96–108)
CHLORIDE SERPL-SCNC: 106 MMOL/L — SIGNIFICANT CHANGE UP (ref 96–108)
CO2 BLDV-SCNC: 26 MMOL/L — SIGNIFICANT CHANGE UP (ref 22–26)
CO2 SERPL-SCNC: 22 MMOL/L — SIGNIFICANT CHANGE UP (ref 22–31)
COLOR SPEC: SIGNIFICANT CHANGE UP
CREAT SERPL-MCNC: 1.06 MG/DL — SIGNIFICANT CHANGE UP (ref 0.5–1.3)
DIFF PNL FLD: NEGATIVE — SIGNIFICANT CHANGE UP
EGFR: 69 ML/MIN/1.73M2 — SIGNIFICANT CHANGE UP
EOSINOPHIL # BLD AUTO: 0.07 K/UL — SIGNIFICANT CHANGE UP (ref 0–0.5)
EOSINOPHIL NFR BLD AUTO: 0.8 % — SIGNIFICANT CHANGE UP (ref 0–6)
GAS PNL BLDV: 138 MMOL/L — SIGNIFICANT CHANGE UP (ref 136–145)
GAS PNL BLDV: SIGNIFICANT CHANGE UP
GAS PNL BLDV: SIGNIFICANT CHANGE UP
GLUCOSE BLDV-MCNC: 116 MG/DL — HIGH (ref 70–99)
GLUCOSE SERPL-MCNC: 123 MG/DL — HIGH (ref 70–99)
GLUCOSE UR QL: NEGATIVE MG/DL — SIGNIFICANT CHANGE UP
HCG SERPL-ACNC: <2 MIU/ML — SIGNIFICANT CHANGE UP
HCO3 BLDV-SCNC: 25 MMOL/L — SIGNIFICANT CHANGE UP (ref 22–29)
HCT VFR BLD CALC: 43.1 % — SIGNIFICANT CHANGE UP (ref 34.5–45)
HCT VFR BLDA CALC: 44 % — SIGNIFICANT CHANGE UP (ref 34.5–46.5)
HGB BLD CALC-MCNC: 14.7 G/DL — SIGNIFICANT CHANGE UP (ref 11.7–16.1)
HGB BLD-MCNC: 14.4 G/DL — SIGNIFICANT CHANGE UP (ref 11.5–15.5)
IMM GRANULOCYTES NFR BLD AUTO: 0.3 % — SIGNIFICANT CHANGE UP (ref 0–0.9)
KETONES UR-MCNC: 15 MG/DL
LACTATE BLDV-MCNC: 1.7 MMOL/L — SIGNIFICANT CHANGE UP (ref 0.5–2)
LEUKOCYTE ESTERASE UR-ACNC: NEGATIVE — SIGNIFICANT CHANGE UP
LIDOCAIN IGE QN: 44 U/L — SIGNIFICANT CHANGE UP (ref 7–60)
LYMPHOCYTES # BLD AUTO: 2.43 K/UL — SIGNIFICANT CHANGE UP (ref 1–3.3)
LYMPHOCYTES # BLD AUTO: 27.5 % — SIGNIFICANT CHANGE UP (ref 13–44)
MCHC RBC-ENTMCNC: 29.8 PG — SIGNIFICANT CHANGE UP (ref 27–34)
MCHC RBC-ENTMCNC: 33.4 GM/DL — SIGNIFICANT CHANGE UP (ref 32–36)
MCV RBC AUTO: 89 FL — SIGNIFICANT CHANGE UP (ref 80–100)
MONOCYTES # BLD AUTO: 0.93 K/UL — HIGH (ref 0–0.9)
MONOCYTES NFR BLD AUTO: 10.5 % — SIGNIFICANT CHANGE UP (ref 2–14)
NEUTROPHILS # BLD AUTO: 5.35 K/UL — SIGNIFICANT CHANGE UP (ref 1.8–7.4)
NEUTROPHILS NFR BLD AUTO: 60.6 % — SIGNIFICANT CHANGE UP (ref 43–77)
NITRITE UR-MCNC: NEGATIVE — SIGNIFICANT CHANGE UP
NRBC # BLD: 0 /100 WBCS — SIGNIFICANT CHANGE UP (ref 0–0)
PCO2 BLDV: 42 MMHG — SIGNIFICANT CHANGE UP (ref 39–42)
PH BLDV: 7.38 — SIGNIFICANT CHANGE UP (ref 7.32–7.43)
PH UR: 6 — SIGNIFICANT CHANGE UP (ref 5–8)
PLATELET # BLD AUTO: 345 K/UL — SIGNIFICANT CHANGE UP (ref 150–400)
PO2 BLDV: 47 MMHG — HIGH (ref 25–45)
POTASSIUM BLDV-SCNC: 3.4 MMOL/L — LOW (ref 3.5–5.1)
POTASSIUM SERPL-MCNC: 3.4 MMOL/L — LOW (ref 3.5–5.3)
POTASSIUM SERPL-SCNC: 3.4 MMOL/L — LOW (ref 3.5–5.3)
PROT SERPL-MCNC: 6.9 G/DL — SIGNIFICANT CHANGE UP (ref 6–8.3)
PROT UR-MCNC: 100 MG/DL
RBC # BLD: 4.84 M/UL — SIGNIFICANT CHANGE UP (ref 3.8–5.2)
RBC # FLD: 12.1 % — SIGNIFICANT CHANGE UP (ref 10.3–14.5)
RBC CASTS # UR COMP ASSIST: 0 /HPF — SIGNIFICANT CHANGE UP (ref 0–4)
SAO2 % BLDV: 76.4 % — SIGNIFICANT CHANGE UP (ref 67–88)
SODIUM SERPL-SCNC: 142 MMOL/L — SIGNIFICANT CHANGE UP (ref 135–145)
SP GR SPEC: >1.03 — HIGH (ref 1–1.03)
UROBILINOGEN FLD QL: 1 MG/DL — SIGNIFICANT CHANGE UP (ref 0.2–1)
WBC # BLD: 8.84 K/UL — SIGNIFICANT CHANGE UP (ref 3.8–10.5)
WBC # FLD AUTO: 8.84 K/UL — SIGNIFICANT CHANGE UP (ref 3.8–10.5)
WBC UR QL: 0 /HPF — SIGNIFICANT CHANGE UP (ref 0–5)

## 2024-02-18 PROCEDURE — 85014 HEMATOCRIT: CPT

## 2024-02-18 PROCEDURE — 82330 ASSAY OF CALCIUM: CPT

## 2024-02-18 PROCEDURE — 85018 HEMOGLOBIN: CPT

## 2024-02-18 PROCEDURE — 85025 COMPLETE CBC W/AUTO DIFF WBC: CPT

## 2024-02-18 PROCEDURE — 74177 CT ABD & PELVIS W/CONTRAST: CPT | Mod: 26,MA

## 2024-02-18 PROCEDURE — 82947 ASSAY GLUCOSE BLOOD QUANT: CPT

## 2024-02-18 PROCEDURE — 99284 EMERGENCY DEPT VISIT MOD MDM: CPT | Mod: 25

## 2024-02-18 PROCEDURE — 84702 CHORIONIC GONADOTROPIN TEST: CPT

## 2024-02-18 PROCEDURE — 71045 X-RAY EXAM CHEST 1 VIEW: CPT

## 2024-02-18 PROCEDURE — 84132 ASSAY OF SERUM POTASSIUM: CPT

## 2024-02-18 PROCEDURE — 74177 CT ABD & PELVIS W/CONTRAST: CPT | Mod: MA

## 2024-02-18 PROCEDURE — 83690 ASSAY OF LIPASE: CPT

## 2024-02-18 PROCEDURE — 84295 ASSAY OF SERUM SODIUM: CPT

## 2024-02-18 PROCEDURE — 71045 X-RAY EXAM CHEST 1 VIEW: CPT | Mod: 26

## 2024-02-18 PROCEDURE — 83605 ASSAY OF LACTIC ACID: CPT

## 2024-02-18 PROCEDURE — 82803 BLOOD GASES ANY COMBINATION: CPT

## 2024-02-18 PROCEDURE — 96374 THER/PROPH/DIAG INJ IV PUSH: CPT | Mod: XU

## 2024-02-18 PROCEDURE — 80053 COMPREHEN METABOLIC PANEL: CPT

## 2024-02-18 PROCEDURE — 81001 URINALYSIS AUTO W/SCOPE: CPT

## 2024-02-18 PROCEDURE — 99285 EMERGENCY DEPT VISIT HI MDM: CPT

## 2024-02-18 PROCEDURE — 82435 ASSAY OF BLOOD CHLORIDE: CPT

## 2024-02-18 RX ORDER — SODIUM CHLORIDE 9 MG/ML
1000 INJECTION INTRAMUSCULAR; INTRAVENOUS; SUBCUTANEOUS ONCE
Refills: 0 | Status: COMPLETED | OUTPATIENT
Start: 2024-02-18 | End: 2024-02-18

## 2024-02-18 RX ORDER — ONDANSETRON 8 MG/1
1 TABLET, FILM COATED ORAL
Qty: 1 | Refills: 0
Start: 2024-02-18 | End: 2024-02-20

## 2024-02-18 RX ORDER — ONDANSETRON 8 MG/1
4 TABLET, FILM COATED ORAL ONCE
Refills: 0 | Status: COMPLETED | OUTPATIENT
Start: 2024-02-18 | End: 2024-02-18

## 2024-02-18 RX ADMIN — SODIUM CHLORIDE 1000 MILLILITER(S): 9 INJECTION INTRAMUSCULAR; INTRAVENOUS; SUBCUTANEOUS at 16:35

## 2024-02-18 RX ADMIN — ONDANSETRON 4 MILLIGRAM(S): 8 TABLET, FILM COATED ORAL at 16:35

## 2024-02-18 NOTE — ED ADULT NURSE NOTE - NSFALLUNIVINTERV_ED_ALL_ED
Bed/Stretcher in lowest position, wheels locked, appropriate side rails in place/Call bell, personal items and telephone in reach/Instruct patient to call for assistance before getting out of bed/chair/stretcher/Non-slip footwear applied when patient is off stretcher/[place] to call system/Physically safe environment - no spills, clutter or unnecessary equipment/Purposeful proactive rounding/Room/bathroom lighting operational, light cord in reach

## 2024-02-18 NOTE — ED PROVIDER NOTE - NSFOLLOWUPCLINICS_GEN_ALL_ED_FT
Gastroenterology at [facility]  Gastroenterology  [address] [place] [id number]  Phone: [phone]  Fax:

## 2024-02-18 NOTE — ED PROVIDER NOTE - OBJECTIVE STATEMENT
39-year-old female with past medical history of hypertension, presenting with hematemesis for the past 2 days. Patient reports that she has had episode previously a few weeks ago. For the past 2 days, patient has had 2 episodes of around 20 cc of bright red blood.  Patient states that hematemesis occurred after a bout of vomiting.  Denies any chest pain, fevers.  Patient reports epigastric pain.

## 2024-02-18 NOTE — ED PROVIDER NOTE - PROGRESS NOTE DETAILS
Received signout. Ct with no acute pathology. Tolerated PO. To be discharged.  -Ashutosh Chatterjee PGY-2

## 2024-02-18 NOTE — ED PROVIDER NOTE - PATIENT PORTAL LINK FT
You can access the FollowMyHealth Patient Portal offered by [facility] by registering at the following website: [email or web address]. By joining [facility]’s FollowMyHealth portal, you will also be able to view your health information using other applications (apps) compatible with our system.

## 2024-02-18 NOTE — ED ADULT NURSE NOTE - OBJECTIVE STATEMENT
Patient c/o vomiting blood onset of today. Patient states she vomited 4x and on the most recent one noticed blood in vomit. Patient denies use of blood thinners, states she took motrin yesterday for abd pain. Patient denies fever, chills, sob, chest pain. Patient A&Ox4, ambulatory. No signs of acute distress at this time. Plan of care in progress.

## 2024-02-18 NOTE — ED PROVIDER NOTE - NSFOLLOWUPINSTRUCTIONS_ED_ALL_ED_FT
You were seen in the ED for vomiting, your lab and imaging results are attached.    A prescription for zofran was sent to your pharmacy, please take as prescribed.    Contact information for GI is attached. You should receive a call in the next 48 hours to help set up an appointment.    Return to the ED for new or worsening symptoms.

## 2024-02-18 NOTE — ED PROVIDER NOTE - CLINICAL SUMMARY MEDICAL DECISION MAKING FREE TEXT BOX
39-year-old female with past medical history of hypertension, presenting with hematemesis for the past 2 days.  Hemodynamically stable.  Physical exam with heart regular rate and rhythm, lungs clear to station, abdomen soft medicine nontender.  Concern for Belinda-Connelly tear (most likely) versus Boerhaave syndrome.  Will get labs, chest x-ray, CT abdomen pelvis to evaluate.

## 2024-02-23 LAB
CAST: SIGNIFICANT CHANGE UP /LPF
EPI CELLS # UR: SIGNIFICANT CHANGE UP

## 2024-03-01 ENCOUNTER — NON-APPOINTMENT (OUTPATIENT)
Age: 39
End: 2024-03-01

## 2025-02-27 ENCOUNTER — NON-APPOINTMENT (OUTPATIENT)
Age: 40
End: 2025-02-27

## 2025-03-01 ENCOUNTER — EMERGENCY (EMERGENCY)
Facility: HOSPITAL | Age: 40
LOS: 1 days | Discharge: HOME | End: 2025-03-01
Attending: STUDENT IN AN ORGANIZED HEALTH CARE EDUCATION/TRAINING PROGRAM
Payer: MEDICAID

## 2025-03-01 VITALS
OXYGEN SATURATION: 99 % | TEMPERATURE: 98 F | DIASTOLIC BLOOD PRESSURE: 66 MMHG | SYSTOLIC BLOOD PRESSURE: 91 MMHG | HEART RATE: 97 BPM | RESPIRATION RATE: 18 BRPM

## 2025-03-01 VITALS
HEART RATE: 80 BPM | RESPIRATION RATE: 18 BRPM | OXYGEN SATURATION: 99 % | SYSTOLIC BLOOD PRESSURE: 123 MMHG | DIASTOLIC BLOOD PRESSURE: 75 MMHG | TEMPERATURE: 98 F

## 2025-03-01 LAB
ALBUMIN SERPL ELPH-MCNC: 4.6 G/DL — SIGNIFICANT CHANGE UP (ref 3.3–5)
ALP SERPL-CCNC: 98 U/L — SIGNIFICANT CHANGE UP (ref 40–120)
ALT FLD-CCNC: 19 U/L — SIGNIFICANT CHANGE UP (ref 10–45)
ANION GAP SERPL CALC-SCNC: 18 MMOL/L — HIGH (ref 5–17)
AST SERPL-CCNC: 9 U/L — LOW (ref 10–40)
BASOPHILS # BLD AUTO: 0.04 K/UL — SIGNIFICANT CHANGE UP (ref 0–0.2)
BASOPHILS NFR BLD AUTO: 0.7 % — SIGNIFICANT CHANGE UP (ref 0–2)
BILIRUB SERPL-MCNC: <0.1 MG/DL — LOW (ref 0.2–1.2)
BUN SERPL-MCNC: 10 MG/DL — SIGNIFICANT CHANGE UP (ref 7–23)
CALCIUM SERPL-MCNC: 8.8 MG/DL — SIGNIFICANT CHANGE UP (ref 8.4–10.5)
CHLORIDE SERPL-SCNC: 107 MMOL/L — SIGNIFICANT CHANGE UP (ref 96–108)
CO2 SERPL-SCNC: 20 MMOL/L — LOW (ref 22–31)
CREAT SERPL-MCNC: 0.75 MG/DL — SIGNIFICANT CHANGE UP (ref 0.5–1.3)
EGFR: 104 ML/MIN/1.73M2 — SIGNIFICANT CHANGE UP
EOSINOPHIL # BLD AUTO: 0.03 K/UL — SIGNIFICANT CHANGE UP (ref 0–0.5)
EOSINOPHIL NFR BLD AUTO: 0.5 % — SIGNIFICANT CHANGE UP (ref 0–6)
GLUCOSE SERPL-MCNC: 88 MG/DL — SIGNIFICANT CHANGE UP (ref 70–99)
HCT VFR BLD CALC: 47.4 % — HIGH (ref 34.5–45)
HGB BLD-MCNC: 15.8 G/DL — HIGH (ref 11.5–15.5)
IMM GRANULOCYTES NFR BLD AUTO: 0.9 % — SIGNIFICANT CHANGE UP (ref 0–0.9)
LYMPHOCYTES # BLD AUTO: 2.5 K/UL — SIGNIFICANT CHANGE UP (ref 1–3.3)
LYMPHOCYTES # BLD AUTO: 43 % — SIGNIFICANT CHANGE UP (ref 13–44)
MAGNESIUM SERPL-MCNC: 2.5 MG/DL — SIGNIFICANT CHANGE UP (ref 1.6–2.6)
MCHC RBC-ENTMCNC: 30.1 PG — SIGNIFICANT CHANGE UP (ref 27–34)
MCHC RBC-ENTMCNC: 33.3 G/DL — SIGNIFICANT CHANGE UP (ref 32–36)
MCV RBC AUTO: 90.3 FL — SIGNIFICANT CHANGE UP (ref 80–100)
MONOCYTES # BLD AUTO: 0.44 K/UL — SIGNIFICANT CHANGE UP (ref 0–0.9)
MONOCYTES NFR BLD AUTO: 7.6 % — SIGNIFICANT CHANGE UP (ref 2–14)
NEUTROPHILS # BLD AUTO: 2.75 K/UL — SIGNIFICANT CHANGE UP (ref 1.8–7.4)
NEUTROPHILS NFR BLD AUTO: 47.3 % — SIGNIFICANT CHANGE UP (ref 43–77)
NRBC BLD AUTO-RTO: 0 /100 WBCS — SIGNIFICANT CHANGE UP (ref 0–0)
PHOSPHATE SERPL-MCNC: 4.1 MG/DL — SIGNIFICANT CHANGE UP (ref 2.5–4.5)
PLATELET # BLD AUTO: 372 K/UL — SIGNIFICANT CHANGE UP (ref 150–400)
POTASSIUM SERPL-MCNC: 3.4 MMOL/L — LOW (ref 3.5–5.3)
POTASSIUM SERPL-SCNC: 3.4 MMOL/L — LOW (ref 3.5–5.3)
PROT SERPL-MCNC: 8 G/DL — SIGNIFICANT CHANGE UP (ref 6–8.3)
RBC # BLD: 5.25 M/UL — HIGH (ref 3.8–5.2)
RBC # FLD: 12.1 % — SIGNIFICANT CHANGE UP (ref 10.3–14.5)
SODIUM SERPL-SCNC: 145 MMOL/L — SIGNIFICANT CHANGE UP (ref 135–145)
WBC # BLD: 5.81 K/UL — SIGNIFICANT CHANGE UP (ref 3.8–10.5)
WBC # FLD AUTO: 5.81 K/UL — SIGNIFICANT CHANGE UP (ref 3.8–10.5)

## 2025-03-01 PROCEDURE — 99284 EMERGENCY DEPT VISIT MOD MDM: CPT

## 2025-03-01 PROCEDURE — 36000 PLACE NEEDLE IN VEIN: CPT

## 2025-03-01 PROCEDURE — 83735 ASSAY OF MAGNESIUM: CPT

## 2025-03-01 PROCEDURE — 80053 COMPREHEN METABOLIC PANEL: CPT

## 2025-03-01 PROCEDURE — 85025 COMPLETE CBC W/AUTO DIFF WBC: CPT

## 2025-03-01 PROCEDURE — 84100 ASSAY OF PHOSPHORUS: CPT

## 2025-03-01 PROCEDURE — 99284 EMERGENCY DEPT VISIT MOD MDM: CPT | Mod: 25

## 2025-03-01 PROCEDURE — 93005 ELECTROCARDIOGRAM TRACING: CPT

## 2025-03-01 RX ORDER — SODIUM CHLORIDE 9 G/1000ML
1000 INJECTION, SOLUTION INTRAVENOUS ONCE
Refills: 0 | Status: COMPLETED | OUTPATIENT
Start: 2025-03-01 | End: 2025-03-01

## 2025-03-01 RX ADMIN — SODIUM CHLORIDE 1000 MILLILITER(S): 9 INJECTION, SOLUTION INTRAVENOUS at 18:09

## 2025-03-01 NOTE — ED PROVIDER NOTE - OBJECTIVE STATEMENT
39yo F with PMHx of HTN presenting with an episode of syncope. She endorse she has been having URI symptoms, fever, cough for the past few days and has been tested positive for COVID. She has been eating less as a result and states she feels dehydrated. She was visiting her ex?- today and stood up quickly and passed out. Unsure of down time, but the  estimates it to be less than 30 seconds. She returned to her usual mental status relatively quickly afterwards. She has not had previous similar episodes. Denies any chest pain or nausea/vomiting.

## 2025-03-01 NOTE — ED PROVIDER NOTE - PROGRESS NOTE DETAILS
Junior Fitzpatrick MD PGY3: labs nonactionable, likely dehydrated iso covid, with vagal syncope. pt feeling improved. To finish fluids, dc. Discussed with patient all results including any incidentals. Discussed discharge, follow up, return precautions, medications. Patient verbalizes understanding and is amenable at this time. Answered patient questions.

## 2025-03-01 NOTE — ED PROVIDER NOTE - PATIENT PORTAL LINK FT
You can access the FollowMyHealth Patient Portal offered by [facility] by registering at the following website: [email or web address]. By joining [facility]’s FollowMyHealth portal, you will also be able to view your health information using other applications (apps) compatible with our system.

## 2025-03-01 NOTE — ED ADULT NURSE NOTE - OBJECTIVE STATEMENT
Pt is a 41 yo F w/ PMHx of HTN and HLD complaining of LOC. Pt reports being COVID positive and has been feeling lethargic since diagnosis. Pt states today she felt dizzy while sitting in her car and "passed out".  reports pt was experiencing LOC for approx 20 seconds. Pt denies chest pain or SOB pre and post LOC. Pt is aox4, airway clear and patent, equal chest rise and fall, pulses intact, skin warm and dry, abd non tender and non distended x4, motor and sensory skills intact. Pt denies SOB, chest pain, D/N/V. Pt placed in locked lowered stretcher w. rails raised.

## 2025-03-01 NOTE — ED PROVIDER NOTE - NSFOLLOWUPINSTRUCTIONS_ED_ALL_ED_FT
You were seen in the ED for syncope (passing out)    Your evaluation including blood tests, ekg showed no findings requiring further evaluation or treatment in the hospital at this time.    Please follow up with your PCP as discussed within 1 week. Discuss your symptoms, medications, and results in this packet.    Stay well hydrated, and eat a balanced diet.    Seek immediate medical attention if you experience new or worsening symptoms.

## 2025-03-01 NOTE — ED ADULT NURSE NOTE - NS ED NURSE DISCH DISPOSITION
Keep working on your physical therapy with Breana Amor with free information:    [employer] patient website: [email or web address].[email or web address]    [employer] Program: www.totalcontrolprogram.com    If you think you have a UTI please contact the clinic [phone]    It was a pleasure meeting with you today.  Thank you for allowing me and my team the privilege of caring for you today.  YOU are the reason we are here, and I truly hope we provided you with the excellent service you deserve.  Please let us know if there is anything else we can do for you so that we can be sure you are leaving completely satisfied with your care experience.     Discharged

## 2025-03-01 NOTE — ED PROVIDER NOTE - CLINICAL SUMMARY MEDICAL DECISION MAKING FREE TEXT BOX
41yo F with syncope. EKG without any findings suggestive of arrhythmia. Given COVID positive and episode of syncope after standing up quickly; believe it is likely vasovagal. Will give IV fluids and check electrolytes. If normal; will d/c home. Low suspicion for ACS event at this time. No risk factors for stroke episode.

## 2025-03-01 NOTE — ED ADULT TRIAGE NOTE - CHIEF COMPLAINT QUOTE
passed out in car sitting was not driving , was dx with covid states only took tylenol went to visit her  who shes  to see her son. takes bp meds.

## 2025-03-01 NOTE — ED PROVIDER NOTE - PHYSICAL EXAMINATION
PHYSICAL EXAM:  GENERAL: NAD, well-developed  HEAD:  Atraumatic, Normocephalic  CHEST/LUNG: Clear to auscultation bilaterally; No wheeze  HEART: Regular rate and rhythm; No murmurs, rubs, or gallops  ABDOMEN: Soft, Nontender, Nondistended; Bowel sounds present  EXTREMITIES:  2+ Peripheral Pulses, No clubbing, cyanosis, or edema  PSYCH: AAOx3  NEUROLOGY: non-focal  SKIN: No rashes or lesions